# Patient Record
Sex: MALE | Race: WHITE | NOT HISPANIC OR LATINO | URBAN - METROPOLITAN AREA
[De-identification: names, ages, dates, MRNs, and addresses within clinical notes are randomized per-mention and may not be internally consistent; named-entity substitution may affect disease eponyms.]

---

## 2020-03-07 ENCOUNTER — OUTPATIENT (OUTPATIENT)
Dept: OUTPATIENT SERVICES | Facility: HOSPITAL | Age: 70
LOS: 1 days | Discharge: HOME | End: 2020-03-07
Payer: MEDICARE

## 2020-03-07 DIAGNOSIS — R07.9 CHEST PAIN, UNSPECIFIED: ICD-10-CM

## 2020-03-07 PROCEDURE — 71046 X-RAY EXAM CHEST 2 VIEWS: CPT | Mod: 26

## 2022-08-09 PROBLEM — Z00.00 ENCOUNTER FOR PREVENTIVE HEALTH EXAMINATION: Status: ACTIVE | Noted: 2022-08-09

## 2022-08-10 ENCOUNTER — APPOINTMENT (OUTPATIENT)
Dept: ORTHOPEDIC SURGERY | Facility: CLINIC | Age: 72
End: 2022-08-10

## 2022-08-10 DIAGNOSIS — S42.90XA FRACTURE OF UNSPECIFIED SHOULDER GIRDLE, PART UNSPECIFIED, INITIAL ENCOUNTER FOR CLOSED FRACTURE: ICD-10-CM

## 2022-08-10 PROCEDURE — 99204 OFFICE O/P NEW MOD 45 MIN: CPT

## 2022-08-10 PROCEDURE — 73030 X-RAY EXAM OF SHOULDER: CPT | Mod: RT

## 2022-08-10 NOTE — HISTORY OF PRESENT ILLNESS
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

## 2022-09-21 ENCOUNTER — APPOINTMENT (OUTPATIENT)
Dept: ORTHOPEDIC SURGERY | Facility: CLINIC | Age: 72
End: 2022-09-21

## 2022-09-21 PROCEDURE — 99213 OFFICE O/P EST LOW 20 MIN: CPT

## 2022-09-21 PROCEDURE — 73030 X-RAY EXAM OF SHOULDER: CPT | Mod: RT

## 2022-09-21 NOTE — HISTORY OF PRESENT ILLNESS
[de-identified] : The patient is a 71-year-old male here for subsequent re-evaluation of his right shoulder.  He is status post right greater tuberosity and surgical neck fracture.  He is approximately 2 months out from his injury.  He is doing formal physical therapy once a week.  He is still having pain in the shoulder.

## 2022-09-21 NOTE — PHYSICAL EXAM
[Right] : right shoulder [] : motor and sensory intact distally [de-identified] :  Weakness with rotator cuff resistance testing. [TWNoteComboBox7] : active forward flexion 90 degrees [TWNoteComboBox4] : passive forward flexion 165 degrees [de-identified] : active abduction 90 degrees [TWNoteComboBox6] : internal rotation L3

## 2022-09-21 NOTE — DISCUSSION/SUMMARY
[de-identified] :   I reviewed the x-ray findings with the patient.  I discussed with him that he may lose range of motion with this type of fracture.  I also advised him that he should be doing therapy twice per read not once a week.  I explained to him it may take several months to really feel better from this.  I will see him back in 6 weeks for further evaluation with repeat x-rays.\par \par Supervising physician:  Dr. Kevin

## 2022-09-21 NOTE — DATA REVIEWED
[Right] : of the right [Shoulder] : shoulder [FreeTextEntry1] :   Three views of the right shoulder were obtained here in the office today.  X-rays show the fracture is well healed.  No dislocations.  No soft tissue calcifications.

## 2022-11-09 ENCOUNTER — APPOINTMENT (OUTPATIENT)
Dept: ORTHOPEDIC SURGERY | Facility: CLINIC | Age: 72
End: 2022-11-09

## 2022-11-09 PROCEDURE — 99213 OFFICE O/P EST LOW 20 MIN: CPT

## 2022-11-09 RX ORDER — BLOOD-GLUCOSE METER
W/DEVICE EACH MISCELLANEOUS
Qty: 1 | Refills: 0 | Status: ACTIVE | COMMUNITY
Start: 2022-10-16

## 2022-11-09 RX ORDER — TAMSULOSIN HYDROCHLORIDE 0.4 MG/1
0.4 CAPSULE ORAL
Qty: 90 | Refills: 0 | Status: ACTIVE | COMMUNITY
Start: 2022-10-16

## 2022-11-09 RX ORDER — LANCETS 33 GAUGE
EACH MISCELLANEOUS
Qty: 100 | Refills: 0 | Status: ACTIVE | COMMUNITY
Start: 2022-10-16

## 2022-11-09 RX ORDER — BLOOD SUGAR DIAGNOSTIC
STRIP MISCELLANEOUS
Qty: 50 | Refills: 0 | Status: ACTIVE | COMMUNITY
Start: 2022-10-16

## 2022-11-09 NOTE — HISTORY OF PRESENT ILLNESS
[de-identified] : The patient is a 71-year-old male here for subsequent re-evaluation of his right shoulder.  He is status post right greater tuberosity and surgical neck fracture.  He is approximately 4 months out from his injury.He is doing home therapy exercises for the shoulder.  He is not having pain.

## 2022-11-09 NOTE — PHYSICAL EXAM
[Right] : right shoulder [] : motor and sensory intact distally [de-identified] : Good strength with rotator cuff resistance testing. [TWNoteComboBox7] : active forward flexion 140 degrees [TWNoteComboBox4] : passive forward flexion 165 degrees [de-identified] : active abduction 100 degrees [TWNoteComboBox6] : internal rotation L3

## 2022-11-09 NOTE — DISCUSSION/SUMMARY
[de-identified] :   At this point, the patient will continue home therapy exercises.  I will see him back in 2 months for further evaluation.\par \par Supervising physician:  Dr. Kevin

## 2023-01-11 ENCOUNTER — APPOINTMENT (OUTPATIENT)
Dept: ORTHOPEDIC SURGERY | Facility: CLINIC | Age: 73
End: 2023-01-11
Payer: MEDICARE

## 2023-01-11 DIAGNOSIS — S42.294D OTHER NONDISPLACED FRACTURE OF UPPER END OF RIGHT HUMERUS, SUBSEQUENT ENCOUNTER FOR FRACTURE WITH ROUTINE HEALING: ICD-10-CM

## 2023-01-11 PROCEDURE — 99213 OFFICE O/P EST LOW 20 MIN: CPT

## 2023-01-11 NOTE — PHYSICAL EXAM
[Right] : right shoulder [] : motor and sensory intact distally [de-identified] : Good strength with rotator cuff resistance testing. [TWNoteComboBox7] : active forward flexion 150 degrees [TWNoteComboBox4] : passive forward flexion 165 degrees [de-identified] : active abduction 130 degrees [TWNoteComboBox6] : internal rotation L3

## 2023-01-11 NOTE — DISCUSSION/SUMMARY
[de-identified] : At this point I advised the patient to do home therapy exercises twice a week for his shoulder.  He has lost range of motion with this fracture, he understand that is normal for this fracture.  He is happy with the way his shoulder feels.  We will see him back on a as needed basis for this.\par \par Supervising physician:  Dr. Kevin

## 2023-01-11 NOTE — HISTORY OF PRESENT ILLNESS
[de-identified] : The patient is a 71-year-old male here for subsequent re-evaluation of his right shoulder.  He is status post right greater tuberosity and surgical neck fracture July 2022.  He is approximately 6 months out from his injury.  He is occasionally doing home therapy exercises for the shoulder.  He is not having pain.  He states shoulder feels much better.  He does report that he does not have much range of motion with the right shoulder as he does left shoulder.